# Patient Record
Sex: FEMALE | Race: BLACK OR AFRICAN AMERICAN | NOT HISPANIC OR LATINO | Employment: PART TIME | RURAL
[De-identification: names, ages, dates, MRNs, and addresses within clinical notes are randomized per-mention and may not be internally consistent; named-entity substitution may affect disease eponyms.]

---

## 2020-02-05 ENCOUNTER — HISTORICAL (OUTPATIENT)
Dept: ADMINISTRATIVE | Facility: HOSPITAL | Age: 35
End: 2020-02-05

## 2020-02-08 LAB
LAB AP GENERAL CAT - HISTORICAL: NORMAL
LAB AP INTERPRETATION/RESULT - HISTORICAL: NEGATIVE
LAB AP SPECIMEN ADEQUACY - HISTORICAL: NORMAL
LAB AP SPECIMEN SUBMITTED - HISTORICAL: NORMAL

## 2020-04-28 ENCOUNTER — HISTORICAL (OUTPATIENT)
Dept: ADMINISTRATIVE | Facility: HOSPITAL | Age: 35
End: 2020-04-28

## 2021-04-02 RX ORDER — ACYCLOVIR 400 MG/1
400 TABLET ORAL 2 TIMES DAILY
COMMUNITY
End: 2023-04-13 | Stop reason: ALTCHOICE

## 2021-04-02 RX ORDER — LEVONORGESTREL AND ETHINYL ESTRADIOL 0.1-0.02MG
1 KIT ORAL DAILY
COMMUNITY
End: 2021-04-05 | Stop reason: SDUPTHER

## 2021-04-02 RX ORDER — VALACYCLOVIR HYDROCHLORIDE 1 G/1
1000 TABLET, FILM COATED ORAL DAILY
COMMUNITY
End: 2021-11-18 | Stop reason: SDUPTHER

## 2021-04-05 ENCOUNTER — HISTORICAL (OUTPATIENT)
Dept: ADMINISTRATIVE | Facility: HOSPITAL | Age: 36
End: 2021-04-05

## 2021-04-05 ENCOUNTER — OFFICE VISIT (OUTPATIENT)
Dept: OBSTETRICS AND GYNECOLOGY | Facility: CLINIC | Age: 36
End: 2021-04-05
Payer: OTHER GOVERNMENT

## 2021-04-05 VITALS
TEMPERATURE: 98 F | HEIGHT: 67 IN | HEART RATE: 70 BPM | SYSTOLIC BLOOD PRESSURE: 139 MMHG | WEIGHT: 243.81 LBS | OXYGEN SATURATION: 98 % | BODY MASS INDEX: 38.27 KG/M2 | DIASTOLIC BLOOD PRESSURE: 100 MMHG | RESPIRATION RATE: 16 BRPM

## 2021-04-05 DIAGNOSIS — Z11.4 SCREENING FOR HUMAN IMMUNODEFICIENCY VIRUS: ICD-10-CM

## 2021-04-05 DIAGNOSIS — Z01.419 ROUTINE GYNECOLOGICAL EXAMINATION: Primary | ICD-10-CM

## 2021-04-05 DIAGNOSIS — A60.04 HERPES SIMPLEX VULVOVAGINITIS: ICD-10-CM

## 2021-04-05 DIAGNOSIS — Z11.3 ENCOUNTER FOR SCREENING FOR BACTERIAL SEXUALLY TRANSMITTED DISEASE: ICD-10-CM

## 2021-04-05 DIAGNOSIS — Z11.8 SPECIAL SCREENING EXAMINATION FOR CHLAMYDIAL DISEASE: ICD-10-CM

## 2021-04-05 DIAGNOSIS — N92.1 MENOMETRORRHAGIA: ICD-10-CM

## 2021-04-05 DIAGNOSIS — Z11.3 SCREEN FOR SEXUALLY TRANSMITTED DISEASES: ICD-10-CM

## 2021-04-05 DIAGNOSIS — Z23 FLU VACCINE NEED: ICD-10-CM

## 2021-04-05 DIAGNOSIS — Z01.419 ENCOUNTER FOR GYNECOLOGICAL EXAMINATION WITHOUT ABNORMAL FINDING: ICD-10-CM

## 2021-04-05 DIAGNOSIS — Z12.4 SCREENING FOR MALIGNANT NEOPLASM OF THE CERVIX: ICD-10-CM

## 2021-04-05 DIAGNOSIS — Z30.011 ENCOUNTER FOR INITIAL PRESCRIPTION OF CONTRACEPTIVE PILLS: ICD-10-CM

## 2021-04-05 DIAGNOSIS — Z72.51 HIGH RISK SEXUAL BEHAVIOR, UNSPECIFIED TYPE: ICD-10-CM

## 2021-04-05 LAB
CANDIDA SPECIES: NEGATIVE
GARDNERELLA: POSITIVE
HAV IGM SER QL: NORMAL
HBV CORE IGM SER QL: NORMAL
HBV SURFACE AG SERPL QL IA: NORMAL
HCV AB SER QL: NORMAL
HIV 1+O+2 AB SERPL QL: NORMAL
SYPHILIS AB INTERPRETATION: NORMAL
TRICHOMONAS: NEGATIVE

## 2021-04-05 PROCEDURE — 88142 CYTOPATH C/V THIN LAYER: CPT | Performed by: PATHOLOGY

## 2021-04-05 PROCEDURE — 99395 PR PREVENTIVE VISIT,EST,18-39: ICD-10-PCS | Mod: ,,, | Performed by: OBSTETRICS & GYNECOLOGY

## 2021-04-05 PROCEDURE — 99395 PREV VISIT EST AGE 18-39: CPT | Mod: ,,, | Performed by: OBSTETRICS & GYNECOLOGY

## 2021-04-05 RX ORDER — LEVONORGESTREL AND ETHINYL ESTRADIOL 0.1-0.02MG
1 KIT ORAL DAILY
Qty: 28 TABLET | Refills: 11 | Status: SHIPPED | OUTPATIENT
Start: 2021-04-05 | End: 2021-11-18 | Stop reason: SDUPTHER

## 2021-04-06 LAB
CHLAMYDIA BY PCR: NEGATIVE
N. GONORRHOEAE (GC) BY PCR: NEGATIVE

## 2021-04-08 LAB
HPV 16: NEGATIVE
HPV 18: NEGATIVE
HPV OTHER: NEGATIVE
INSULIN SERPL-ACNC: NORMAL U[IU]/ML
LAB AP CLINICAL INFORMATION: NORMAL
LAB AP GENERAL CAT - HISTORICAL: NORMAL
LAB AP INTERPRETATION/RESULT - HISTORICAL: NEGATIVE
LAB AP SPECIMEN ADEQUACY - HISTORICAL: NORMAL
LAB AP SPECIMEN SUBMITTED - HISTORICAL: NORMAL

## 2021-04-19 ENCOUNTER — TELEPHONE (OUTPATIENT)
Dept: OBSTETRICS AND GYNECOLOGY | Facility: CLINIC | Age: 36
End: 2021-04-19

## 2021-05-03 DIAGNOSIS — R13.10 DYSPHAGIA: Primary | ICD-10-CM

## 2021-05-26 ENCOUNTER — HOSPITAL ENCOUNTER (OUTPATIENT)
Dept: RADIOLOGY | Facility: HOSPITAL | Age: 36
Discharge: HOME | End: 2021-05-26
Payer: OTHER GOVERNMENT

## 2021-05-26 VITALS — WEIGHT: 243 LBS | BODY MASS INDEX: 38.14 KG/M2 | HEIGHT: 67 IN

## 2021-05-26 DIAGNOSIS — Z12.31 ENCOUNTER FOR SCREENING MAMMOGRAM FOR MALIGNANT NEOPLASM OF BREAST: ICD-10-CM

## 2021-05-26 PROCEDURE — 77067 SCR MAMMO BI INCL CAD: CPT | Mod: TC

## 2021-05-27 ENCOUNTER — HOSPITAL ENCOUNTER (OUTPATIENT)
Dept: GASTROENTEROLOGY | Facility: HOSPITAL | Age: 36
Discharge: HOME | End: 2021-05-27
Attending: INTERNAL MEDICINE
Payer: OTHER GOVERNMENT

## 2021-05-27 ENCOUNTER — ANESTHESIA (OUTPATIENT)
Dept: GASTROENTEROLOGY | Facility: HOSPITAL | Age: 36
End: 2021-05-27
Payer: OTHER GOVERNMENT

## 2021-05-27 ENCOUNTER — ANESTHESIA EVENT (OUTPATIENT)
Dept: GASTROENTEROLOGY | Facility: HOSPITAL | Age: 36
End: 2021-05-27
Payer: OTHER GOVERNMENT

## 2021-05-27 VITALS
DIASTOLIC BLOOD PRESSURE: 79 MMHG | SYSTOLIC BLOOD PRESSURE: 140 MMHG | HEART RATE: 68 BPM | OXYGEN SATURATION: 97 % | TEMPERATURE: 97 F | RESPIRATION RATE: 19 BRPM

## 2021-05-27 DIAGNOSIS — K29.00 ACUTE SUPERFICIAL GASTRITIS WITHOUT HEMORRHAGE: ICD-10-CM

## 2021-05-27 DIAGNOSIS — K29.00 ACUTE SUPERFICIAL GASTRITIS WITHOUT HEMORRHAGE: Primary | ICD-10-CM

## 2021-05-27 DIAGNOSIS — R13.10 DYSPHAGIA: ICD-10-CM

## 2021-05-27 DIAGNOSIS — R47.02 DYSPHASIA: Primary | ICD-10-CM

## 2021-05-27 DIAGNOSIS — R13.19 ESOPHAGEAL DYSPHAGIA: ICD-10-CM

## 2021-05-27 LAB
B-HCG UR QL: NEGATIVE
CTP QC/QA: YES
POC URINE HCG: NEGATIVE

## 2021-05-27 PROCEDURE — C1889 IMPLANT/INSERT DEVICE, NOC: HCPCS

## 2021-05-27 PROCEDURE — D9220A PRA ANESTHESIA: Mod: ,,, | Performed by: NURSE ANESTHETIST, CERTIFIED REGISTERED

## 2021-05-27 PROCEDURE — 88305 TISSUE EXAM BY PATHOLOGIST: CPT | Mod: 26,,, | Performed by: PATHOLOGY

## 2021-05-27 PROCEDURE — 25000003 PHARM REV CODE 250: Performed by: NURSE ANESTHETIST, CERTIFIED REGISTERED

## 2021-05-27 PROCEDURE — D9220A PRA ANESTHESIA: ICD-10-PCS | Mod: ,,, | Performed by: NURSE ANESTHETIST, CERTIFIED REGISTERED

## 2021-05-27 PROCEDURE — 81025 URINE PREGNANCY TEST: CPT | Performed by: INTERNAL MEDICINE

## 2021-05-27 PROCEDURE — 27201423 OPTIME MED/SURG SUP & DEVICES STERILE SUPPLY

## 2021-05-27 PROCEDURE — 37000008 HC ANESTHESIA 1ST 15 MINUTES

## 2021-05-27 PROCEDURE — 43450 DILATE ESOPHAGUS 1/MULT PASS: CPT

## 2021-05-27 PROCEDURE — 27000284 HC CANNULA NASAL: Performed by: NURSE ANESTHETIST, CERTIFIED REGISTERED

## 2021-05-27 PROCEDURE — 88342 IMHCHEM/IMCYTCHM 1ST ANTB: CPT | Mod: 26,,, | Performed by: PATHOLOGY

## 2021-05-27 PROCEDURE — 63600175 PHARM REV CODE 636 W HCPCS: Performed by: NURSE ANESTHETIST, CERTIFIED REGISTERED

## 2021-05-27 PROCEDURE — 88342 SURGICAL PATHOLOGY: ICD-10-PCS | Mod: 26,,, | Performed by: PATHOLOGY

## 2021-05-27 PROCEDURE — 88305 SURGICAL PATHOLOGY: ICD-10-PCS | Mod: 26,,, | Performed by: PATHOLOGY

## 2021-05-27 PROCEDURE — 88305 TISSUE EXAM BY PATHOLOGIST: CPT | Mod: SUR | Performed by: INTERNAL MEDICINE

## 2021-05-27 PROCEDURE — 43239 EGD BIOPSY SINGLE/MULTIPLE: CPT

## 2021-05-27 RX ORDER — FENTANYL CITRATE 50 UG/ML
INJECTION, SOLUTION INTRAMUSCULAR; INTRAVENOUS
Status: DISCONTINUED | OUTPATIENT
Start: 2021-05-27 | End: 2021-05-27

## 2021-05-27 RX ORDER — LIDOCAINE HYDROCHLORIDE 20 MG/ML
INJECTION, SOLUTION EPIDURAL; INFILTRATION; INTRACAUDAL; PERINEURAL
Status: DISCONTINUED | OUTPATIENT
Start: 2021-05-27 | End: 2021-05-27

## 2021-05-27 RX ORDER — PROPOFOL 10 MG/ML
INJECTION, EMULSION INTRAVENOUS
Status: DISCONTINUED | OUTPATIENT
Start: 2021-05-27 | End: 2021-05-27

## 2021-05-27 RX ADMIN — PROPOFOL 50 MG: 10 INJECTION, EMULSION INTRAVENOUS at 09:05

## 2021-05-27 RX ADMIN — LIDOCAINE HYDROCHLORIDE 100 MG: 20 INJECTION, SOLUTION INTRAVENOUS at 09:05

## 2021-05-27 RX ADMIN — SODIUM CHLORIDE: 9 INJECTION, SOLUTION INTRAVENOUS at 09:05

## 2021-05-27 RX ADMIN — FENTANYL CITRATE 100 MCG: 50 INJECTION INTRAMUSCULAR; INTRAVENOUS at 09:05

## 2021-05-28 LAB
ESTROGEN SERPL-MCNC: NORMAL PG/ML
LAB AP GROSS DESCRIPTION: NORMAL
LAB AP LABORATORY NOTES: NORMAL
T3RU NFR SERPL: NORMAL %

## 2021-06-02 RX ORDER — PANTOPRAZOLE SODIUM 40 MG/1
40 TABLET, DELAYED RELEASE ORAL DAILY
COMMUNITY

## 2021-06-02 RX ORDER — ARIPIPRAZOLE 20 MG/1
5 TABLET ORAL DAILY
COMMUNITY

## 2021-06-02 RX ORDER — QUETIAPINE FUMARATE 300 MG/1
300 TABLET, FILM COATED ORAL NIGHTLY
COMMUNITY

## 2021-06-02 RX ORDER — IBUPROFEN 200 MG
1 TABLET ORAL
COMMUNITY
End: 2023-04-13

## 2021-06-02 RX ORDER — CHOLESTYRAMINE LIGHT 4 G/5.7G
4 POWDER, FOR SUSPENSION ORAL 2 TIMES DAILY
COMMUNITY

## 2021-06-02 RX ORDER — DULOXETIN HYDROCHLORIDE 30 MG/1
30 CAPSULE, DELAYED RELEASE ORAL DAILY
COMMUNITY

## 2021-06-08 ENCOUNTER — TELEPHONE (OUTPATIENT)
Dept: GASTROENTEROLOGY | Facility: CLINIC | Age: 36
End: 2021-06-08

## 2021-06-09 ENCOUNTER — ANESTHESIA (OUTPATIENT)
Dept: GASTROENTEROLOGY | Facility: HOSPITAL | Age: 36
End: 2021-06-09
Payer: OTHER GOVERNMENT

## 2021-06-09 ENCOUNTER — HOSPITAL ENCOUNTER (OUTPATIENT)
Dept: GASTROENTEROLOGY | Facility: HOSPITAL | Age: 36
Discharge: HOME | End: 2021-06-09
Attending: INTERNAL MEDICINE
Payer: OTHER GOVERNMENT

## 2021-06-09 ENCOUNTER — ANESTHESIA EVENT (OUTPATIENT)
Dept: GASTROENTEROLOGY | Facility: HOSPITAL | Age: 36
End: 2021-06-09
Payer: OTHER GOVERNMENT

## 2021-06-09 VITALS
DIASTOLIC BLOOD PRESSURE: 89 MMHG | BODY MASS INDEX: 39.16 KG/M2 | WEIGHT: 250 LBS | SYSTOLIC BLOOD PRESSURE: 149 MMHG | RESPIRATION RATE: 13 BRPM | HEART RATE: 75 BPM | OXYGEN SATURATION: 100 %

## 2021-06-09 DIAGNOSIS — R13.19 ESOPHAGEAL DYSPHAGIA: ICD-10-CM

## 2021-06-09 DIAGNOSIS — K92.2 LOWER GI BLEEDING: ICD-10-CM

## 2021-06-09 DIAGNOSIS — K57.30 DIVERTICULA, COLON: ICD-10-CM

## 2021-06-09 LAB
B-HCG UR QL: NEGATIVE
CTP QC/QA: YES
POC URINE HCG: NEGATIVE

## 2021-06-09 PROCEDURE — D9220A PRA ANESTHESIA: ICD-10-PCS | Mod: ,,, | Performed by: NURSE ANESTHETIST, CERTIFIED REGISTERED

## 2021-06-09 PROCEDURE — 63600175 PHARM REV CODE 636 W HCPCS: Performed by: NURSE ANESTHETIST, CERTIFIED REGISTERED

## 2021-06-09 PROCEDURE — 00811 ANES LWR INTST NDSC NOS: CPT

## 2021-06-09 PROCEDURE — 37000008 HC ANESTHESIA 1ST 15 MINUTES

## 2021-06-09 PROCEDURE — 25000003 PHARM REV CODE 250: Performed by: NURSE ANESTHETIST, CERTIFIED REGISTERED

## 2021-06-09 PROCEDURE — 45378 DIAGNOSTIC COLONOSCOPY: CPT

## 2021-06-09 PROCEDURE — 27201423 OPTIME MED/SURG SUP & DEVICES STERILE SUPPLY

## 2021-06-09 PROCEDURE — 81025 URINE PREGNANCY TEST: CPT | Performed by: INTERNAL MEDICINE

## 2021-06-09 PROCEDURE — D9220A PRA ANESTHESIA: Mod: ,,, | Performed by: NURSE ANESTHETIST, CERTIFIED REGISTERED

## 2021-06-09 PROCEDURE — 37000009 HC ANESTHESIA EA ADD 15 MINS

## 2021-06-09 PROCEDURE — 27000284 HC CANNULA NASAL: Performed by: NURSE ANESTHETIST, CERTIFIED REGISTERED

## 2021-06-09 RX ORDER — SODIUM CHLORIDE 0.9 % (FLUSH) 0.9 %
10 SYRINGE (ML) INJECTION
Status: DISCONTINUED | OUTPATIENT
Start: 2021-06-09 | End: 2021-06-10 | Stop reason: HOSPADM

## 2021-06-09 RX ORDER — LIDOCAINE HYDROCHLORIDE 20 MG/ML
INJECTION INTRAVENOUS
Status: DISCONTINUED | OUTPATIENT
Start: 2021-06-09 | End: 2021-06-09

## 2021-06-09 RX ORDER — PROPOFOL 10 MG/ML
VIAL (ML) INTRAVENOUS
Status: DISCONTINUED | OUTPATIENT
Start: 2021-06-09 | End: 2021-06-09

## 2021-06-09 RX ADMIN — PROPOFOL 50 MG: 10 INJECTION, EMULSION INTRAVENOUS at 03:06

## 2021-06-09 RX ADMIN — LIDOCAINE HYDROCHLORIDE 50 MG: 20 INJECTION, SOLUTION INTRAVENOUS at 03:06

## 2021-06-09 RX ADMIN — SODIUM CHLORIDE: 9 INJECTION, SOLUTION INTRAVENOUS at 03:06

## 2021-11-18 ENCOUNTER — OFFICE VISIT (OUTPATIENT)
Dept: OBSTETRICS AND GYNECOLOGY | Facility: CLINIC | Age: 36
End: 2021-11-18
Payer: OTHER GOVERNMENT

## 2021-11-18 VITALS
BODY MASS INDEX: 41.91 KG/M2 | TEMPERATURE: 98 F | WEIGHT: 267 LBS | HEART RATE: 78 BPM | HEIGHT: 67 IN | SYSTOLIC BLOOD PRESSURE: 131 MMHG | DIASTOLIC BLOOD PRESSURE: 85 MMHG | RESPIRATION RATE: 18 BRPM

## 2021-11-18 DIAGNOSIS — N92.1 MENOMETRORRHAGIA: Primary | ICD-10-CM

## 2021-11-18 PROCEDURE — 99214 PR OFFICE/OUTPT VISIT, EST, LEVL IV, 30-39 MIN: ICD-10-PCS | Mod: ,,, | Performed by: OBSTETRICS & GYNECOLOGY

## 2021-11-18 PROCEDURE — 99214 OFFICE O/P EST MOD 30 MIN: CPT | Mod: ,,, | Performed by: OBSTETRICS & GYNECOLOGY

## 2021-11-18 RX ORDER — LEVONORGESTREL AND ETHINYL ESTRADIOL 0.1-0.02MG
1 KIT ORAL DAILY
Qty: 28 TABLET | Refills: 11 | Status: SHIPPED | OUTPATIENT
Start: 2021-11-18 | End: 2022-08-01 | Stop reason: SDUPTHER

## 2021-11-18 RX ORDER — VALACYCLOVIR HYDROCHLORIDE 1 G/1
1000 TABLET, FILM COATED ORAL DAILY
Qty: 30 TABLET | Refills: 11 | Status: SHIPPED | OUTPATIENT
Start: 2021-11-18 | End: 2023-04-13 | Stop reason: SDUPTHER

## 2022-04-12 ENCOUNTER — OFFICE VISIT (OUTPATIENT)
Dept: OBSTETRICS AND GYNECOLOGY | Facility: CLINIC | Age: 37
End: 2022-04-12
Payer: OTHER GOVERNMENT

## 2022-04-12 VITALS
BODY MASS INDEX: 42.38 KG/M2 | RESPIRATION RATE: 18 BRPM | WEIGHT: 270 LBS | SYSTOLIC BLOOD PRESSURE: 143 MMHG | DIASTOLIC BLOOD PRESSURE: 95 MMHG | HEIGHT: 67 IN | HEART RATE: 81 BPM

## 2022-04-12 DIAGNOSIS — Z11.3 SCREEN FOR STD (SEXUALLY TRANSMITTED DISEASE): ICD-10-CM

## 2022-04-12 DIAGNOSIS — N94.6 DYSMENORRHEA: ICD-10-CM

## 2022-04-12 DIAGNOSIS — Z12.4 SCREENING FOR MALIGNANT NEOPLASM OF THE CERVIX: ICD-10-CM

## 2022-04-12 DIAGNOSIS — Z11.4 SCREENING FOR HUMAN IMMUNODEFICIENCY VIRUS: ICD-10-CM

## 2022-04-12 DIAGNOSIS — N92.0 MENORRHAGIA WITH REGULAR CYCLE: ICD-10-CM

## 2022-04-12 DIAGNOSIS — Z01.419 ROUTINE GYNECOLOGICAL EXAMINATION: Primary | ICD-10-CM

## 2022-04-12 DIAGNOSIS — Z72.51 HIGH RISK SEXUAL BEHAVIOR, UNSPECIFIED TYPE: ICD-10-CM

## 2022-04-12 LAB
CANDIDA SPECIES: NEGATIVE
GARDNERELLA: NEGATIVE
HAV IGM SER QL: NORMAL
HBV CORE IGM SER QL: NORMAL
HBV SURFACE AG SERPL QL IA: NORMAL
HCV AB SER QL: NORMAL
HIV 1+O+2 AB SERPL QL: NORMAL
SYPHILIS AB INTERPRETATION: NORMAL
TRICHOMONAS: POSITIVE

## 2022-04-12 PROCEDURE — 87591 CHLAMYDIA/GONORRHOEAE(GC), PCR: ICD-10-PCS | Mod: ,,, | Performed by: CLINICAL MEDICAL LABORATORY

## 2022-04-12 PROCEDURE — 87491 CHLMYD TRACH DNA AMP PROBE: CPT | Mod: ,,, | Performed by: CLINICAL MEDICAL LABORATORY

## 2022-04-12 PROCEDURE — 87660 BACTERIAL VAGINOSIS: ICD-10-PCS | Mod: ,,, | Performed by: CLINICAL MEDICAL LABORATORY

## 2022-04-12 PROCEDURE — 80074 ACUTE HEPATITIS PANEL: CPT | Mod: ,,, | Performed by: CLINICAL MEDICAL LABORATORY

## 2022-04-12 PROCEDURE — 87660 TRICHOMONAS VAGIN DIR PROBE: CPT | Mod: ,,, | Performed by: CLINICAL MEDICAL LABORATORY

## 2022-04-12 PROCEDURE — 87510 BACTERIAL VAGINOSIS: ICD-10-PCS | Mod: ,,, | Performed by: CLINICAL MEDICAL LABORATORY

## 2022-04-12 PROCEDURE — 87480 CANDIDA DNA DIR PROBE: CPT | Mod: ,,, | Performed by: CLINICAL MEDICAL LABORATORY

## 2022-04-12 PROCEDURE — 87510 GARDNER VAG DNA DIR PROBE: CPT | Mod: ,,, | Performed by: CLINICAL MEDICAL LABORATORY

## 2022-04-12 PROCEDURE — 87389 HIV 1 / 2 ANTIBODY: ICD-10-PCS | Mod: ,,, | Performed by: CLINICAL MEDICAL LABORATORY

## 2022-04-12 PROCEDURE — 87624 HUMAN PAPILLOMAVIRUS (HPV): ICD-10-PCS | Mod: ,,, | Performed by: CLINICAL MEDICAL LABORATORY

## 2022-04-12 PROCEDURE — 87389 HIV-1 AG W/HIV-1&-2 AB AG IA: CPT | Mod: ,,, | Performed by: CLINICAL MEDICAL LABORATORY

## 2022-04-12 PROCEDURE — 87591 N.GONORRHOEAE DNA AMP PROB: CPT | Mod: ,,, | Performed by: CLINICAL MEDICAL LABORATORY

## 2022-04-12 PROCEDURE — 36415 PR COLLECTION VENOUS BLOOD,VENIPUNCTURE: ICD-10-PCS | Mod: ,,, | Performed by: OBSTETRICS & GYNECOLOGY

## 2022-04-12 PROCEDURE — 99395 PREV VISIT EST AGE 18-39: CPT | Mod: ,,, | Performed by: OBSTETRICS & GYNECOLOGY

## 2022-04-12 PROCEDURE — 87624 HPV HI-RISK TYP POOLED RSLT: CPT | Mod: ,,, | Performed by: CLINICAL MEDICAL LABORATORY

## 2022-04-12 PROCEDURE — 99395 PR PREVENTIVE VISIT,EST,18-39: ICD-10-PCS | Mod: ,,, | Performed by: OBSTETRICS & GYNECOLOGY

## 2022-04-12 PROCEDURE — 86780 TREPONEMA PALLIDUM (SYPHILIS) ANTIBODY: ICD-10-PCS | Mod: ,,, | Performed by: CLINICAL MEDICAL LABORATORY

## 2022-04-12 PROCEDURE — 88142 CYTOPATH C/V THIN LAYER: CPT | Mod: GCY | Performed by: OBSTETRICS & GYNECOLOGY

## 2022-04-12 PROCEDURE — 87491 CHLAMYDIA/GONORRHOEAE(GC), PCR: ICD-10-PCS | Mod: ,,, | Performed by: CLINICAL MEDICAL LABORATORY

## 2022-04-12 PROCEDURE — 80074 HEPATITIS PANEL, ACUTE: ICD-10-PCS | Mod: ,,, | Performed by: CLINICAL MEDICAL LABORATORY

## 2022-04-12 PROCEDURE — 86780 TREPONEMA PALLIDUM: CPT | Mod: ,,, | Performed by: CLINICAL MEDICAL LABORATORY

## 2022-04-12 PROCEDURE — 87480 BACTERIAL VAGINOSIS: ICD-10-PCS | Mod: ,,, | Performed by: CLINICAL MEDICAL LABORATORY

## 2022-04-12 PROCEDURE — 36415 COLL VENOUS BLD VENIPUNCTURE: CPT | Mod: ,,, | Performed by: OBSTETRICS & GYNECOLOGY

## 2022-04-12 NOTE — PROGRESS NOTES
"CC: Well woman exam    Amanda Mccracken is a 36 y.o. female  presents for well woman exam.  LMP: Patient's last menstrual period was 2022 (exact date)..  Pt complains of heavy cycles lasting 6 days with painful menses.    Past Medical History:   Diagnosis Date    Acute superficial gastritis without hemorrhage 2021    Chlamydia     Diverticula, colon 2021    Esophageal dysphagia 2021    Gonorrhea     HSV (herpes simplex virus) infection     Lower GI bleeding 2021    PTSD (post-traumatic stress disorder)      Past Surgical History:   Procedure Laterality Date    CHOLECYSTECTOMY      DIAGNOSTIC LAPAROSCOPY      DILATION AND CURETTAGE OF UTERUS      LAPAROSCOPIC MYOMECTOMY  2013    TOTAL REDUCTION MAMMOPLASTY       Social History     Socioeconomic History    Marital status: Single   Tobacco Use    Smoking status: Never Smoker    Smokeless tobacco: Never Used   Substance and Sexual Activity    Alcohol use: Yes     Comment: socially    Drug use: Not Currently     Comment: CBD use    Sexual activity: Yes     Partners: Male     Birth control/protection: OCP, None     Family History   Problem Relation Age of Onset    Hypertension Father     Hypertension Mother     No Known Problems Brother     No Known Problems Sister     No Known Problems Brother     No Known Problems Brother     No Known Problems Sister     No Known Problems Sister     No Known Problems Son      OB History        1    Para   1    Term   1            AB        Living   1       SAB        IAB        Ectopic        Multiple        Live Births   1                 BP (!) 143/95 (BP Location: Left arm, Patient Position: Sitting, BP Method: Large (Manual))   Pulse 81   Resp 18   Ht 5' 7" (1.702 m)   Wt 122.5 kg (270 lb)   LMP 2022 (Exact Date)   BMI 42.29 kg/m²       ROS:  GENERAL: Denies weight gain or weight loss. Feeling well overall.   SKIN: Denies rash or lesions. "   HEAD: Denies head injury or headache.   NODES: Denies enlarged lymph nodes.   CHEST: Denies chest pain or shortness of breath.   CARDIOVASCULAR: Denies palpitations or left sided chest pain.   ABDOMEN: No abdominal pain, constipation, diarrhea, nausea, vomiting or rectal bleeding.   URINARY: No frequency, dysuria, hematuria, or burning on urination.  REPRODUCTIVE: See HPI.   BREASTS: The patient performs breast self-examination and denies pain, lumps, or nipple discharge.   HEMATOLOGIC: No easy bruisability or excessive bleeding.   MUSCULOSKELETAL: Denies joint pain or swelling.   NEUROLOGIC: Denies syncope or weakness.   PSYCHIATRIC: Denies depression, anxiety or mood swings.    PHYSICAL EXAM:  APPEARANCE: Well nourished, well developed, in no acute distress.  AFFECT: WNL, alert and oriented x 3  SKIN: No acne or hirsutism  NECK: Neck symmetric without masses or thyromegaly  NODES: No inguinal, cervical, axillary, or femoral lymph node enlargement  CHEST: Good respiratory effect  ABDOMEN: Soft.  No tenderness or masses.  No hepatosplenomegaly.  No hernias.  BREASTS: Symmetrical, no skin changes or visible lesions.  No palpable masses, nipple discharge bilaterally.  PELVIC: Normal external genitalia without lesions.  Normal hair distribution.  Adequate perineal body, normal urethral meatus.  Vagina moist and well rugated without lesions or discharge.  Cervix pink, without lesions, discharge or tenderness.  No significant cystocele or rectocele.  Bimanual exam shows uterus to be normal size, regular, mobile and nontender.  Adnexa without masses or tenderness.    EXTREMITIES: No edema.    Routine gynecological examination  -     ThinPrep Pap Test; Future; Expected date: 04/12/2022    Screening for malignant neoplasm of the cervix  -     ThinPrep Pap Test; Future; Expected date: 04/12/2022    Screen for STD (sexually transmitted disease)  -     Chlamydia/GC, PCR; Future; Expected date: 04/12/2022  -     HIV 1/2 Ag/Ab  (4th Gen); Future; Expected date: 04/12/2022  -     Treponema Pallidum (Syphillis) Antibody; Future; Expected date: 04/12/2022  -     Hepatitis Panel, Acute; Future; Expected date: 04/12/2022  -     Bacterial Vaginosis; Future; Expected date: 04/12/2022    High risk sexual behavior, unspecified type  -     Chlamydia/GC, PCR; Future; Expected date: 04/12/2022  -     HIV 1/2 Ag/Ab (4th Gen); Future; Expected date: 04/12/2022  -     Treponema Pallidum (Syphillis) Antibody; Future; Expected date: 04/12/2022  -     Hepatitis Panel, Acute; Future; Expected date: 04/12/2022  -     Bacterial Vaginosis; Future; Expected date: 04/12/2022    Screening for human immunodeficiency virus  -     HIV 1/2 Ag/Ab (4th Gen); Future; Expected date: 04/12/2022    Menorrhagia with regular cycle  -     US Pelvis Complete Non OB; Future; Expected date: 04/19/2022    Dysmenorrhea  -     US Pelvis Complete Non OB; Future; Expected date: 04/19/2022            Patient was counseled today on A.C.S. Pap guidelines and recommendations for yearly pelvic exams, mammograms and monthly self breast exams; to see her PCP for other health maintenance.   Exercise regimen encouraged  Healthy food choices encouraged  Questions answered to desired level of satisfaction  Verbalized understanding to all information and instructions    Follow up in about 1 year (around 4/12/2023) for 1 week US, 2 weeks EMB/Endosee and 1 year Annual Exam.

## 2022-04-13 LAB
CHLAMYDIA BY PCR: NEGATIVE
N. GONORRHOEAE (GC) BY PCR: NEGATIVE

## 2022-04-14 ENCOUNTER — TELEPHONE (OUTPATIENT)
Dept: OBSTETRICS AND GYNECOLOGY | Facility: CLINIC | Age: 37
End: 2022-04-14
Payer: OTHER GOVERNMENT

## 2022-04-14 NOTE — TELEPHONE ENCOUNTER
----- Message from Nelly Mack sent at 4/14/2022  9:51 AM CDT -----  Forgot to mention something to  when she was in here on tuesday

## 2022-04-14 NOTE — TELEPHONE ENCOUNTER
----- Message from Catie Chino RN sent at 4/14/2022  1:38 PM CDT -----  Trichomonas Positive; Rx to Pharmacy per Dr. Turner

## 2022-04-14 NOTE — TELEPHONE ENCOUNTER
Verbal Rx per Dr. Turner w/read back: Flagyl (metronidazole)  500 mg 2x/day for 7 days, refill 0    Contacted [facility] in [place], [place] at [phone]; Verbal Rx given to [facility] w/read back

## 2022-04-15 LAB
GH SERPL-MCNC: NORMAL NG/ML
HPV 16: NEGATIVE
HPV 18: NEGATIVE
HPV OTHER: NEGATIVE
INSULIN SERPL-ACNC: NORMAL U[IU]/ML
LAB AP CLINICAL INFORMATION: NORMAL
LAB AP GYN INTERPRETATION: NEGATIVE
LAB AP PAP DISCLAIMER COMMENTS: NORMAL
RENIN PLAS-CCNC: NORMAL NG/ML/H

## 2022-04-19 ENCOUNTER — PROCEDURE VISIT (OUTPATIENT)
Dept: OBSTETRICS AND GYNECOLOGY | Facility: CLINIC | Age: 37
End: 2022-04-19
Attending: OBSTETRICS & GYNECOLOGY
Payer: OTHER GOVERNMENT

## 2022-04-19 DIAGNOSIS — N94.6 DYSMENORRHEA: ICD-10-CM

## 2022-04-19 DIAGNOSIS — N92.0 MENORRHAGIA WITH REGULAR CYCLE: ICD-10-CM

## 2022-04-19 PROCEDURE — 76856 PR  ECHO,PELVIC (NONOBSTETRIC): ICD-10-PCS | Mod: ,,, | Performed by: OBSTETRICS & GYNECOLOGY

## 2022-04-19 PROCEDURE — 76856 US EXAM PELVIC COMPLETE: CPT | Mod: ,,, | Performed by: OBSTETRICS & GYNECOLOGY

## 2022-04-19 PROCEDURE — 99499 UNLISTED E&M SERVICE: CPT | Mod: ,,, | Performed by: OBSTETRICS & GYNECOLOGY

## 2022-04-19 PROCEDURE — 99499 NO LOS: ICD-10-PCS | Mod: ,,, | Performed by: OBSTETRICS & GYNECOLOGY

## 2022-04-26 ENCOUNTER — PROCEDURE VISIT (OUTPATIENT)
Dept: OBSTETRICS AND GYNECOLOGY | Facility: CLINIC | Age: 37
End: 2022-04-26
Payer: OTHER GOVERNMENT

## 2022-04-26 VITALS
BODY MASS INDEX: 42.53 KG/M2 | RESPIRATION RATE: 18 BRPM | SYSTOLIC BLOOD PRESSURE: 139 MMHG | HEIGHT: 67 IN | WEIGHT: 271 LBS | HEART RATE: 63 BPM | DIASTOLIC BLOOD PRESSURE: 83 MMHG

## 2022-04-26 DIAGNOSIS — N94.6 DYSMENORRHEA: ICD-10-CM

## 2022-04-26 DIAGNOSIS — N92.0 MENORRHAGIA WITH REGULAR CYCLE: Primary | ICD-10-CM

## 2022-04-26 LAB
B-HCG UR QL: NEGATIVE
CTP QC/QA: YES

## 2022-04-26 PROCEDURE — 81025 URINE PREGNANCY TEST: CPT | Mod: QW,,, | Performed by: OBSTETRICS & GYNECOLOGY

## 2022-04-26 PROCEDURE — 58558 HYSTEROSCOPY BIOPSY: CPT | Mod: ,,, | Performed by: OBSTETRICS & GYNECOLOGY

## 2022-04-26 PROCEDURE — 99499 NO LOS: ICD-10-PCS | Mod: ,,, | Performed by: OBSTETRICS & GYNECOLOGY

## 2022-04-26 PROCEDURE — 88305 SURGICAL PATHOLOGY: ICD-10-PCS | Mod: 26,,, | Performed by: PATHOLOGY

## 2022-04-26 PROCEDURE — 88305 TISSUE EXAM BY PATHOLOGIST: CPT | Mod: 26,,, | Performed by: PATHOLOGY

## 2022-04-26 PROCEDURE — 88305 TISSUE EXAM BY PATHOLOGIST: CPT | Mod: SUR | Performed by: OBSTETRICS & GYNECOLOGY

## 2022-04-26 PROCEDURE — 99499 UNLISTED E&M SERVICE: CPT | Mod: ,,, | Performed by: OBSTETRICS & GYNECOLOGY

## 2022-04-26 PROCEDURE — 58558 PR HYSTEROSCOPY,W/ENDO BX: ICD-10-PCS | Mod: ,,, | Performed by: OBSTETRICS & GYNECOLOGY

## 2022-04-26 PROCEDURE — 81025 POCT URINE PREGNANCY: ICD-10-PCS | Mod: QW,,, | Performed by: OBSTETRICS & GYNECOLOGY

## 2022-04-26 NOTE — PROCEDURES
Procedures   Hysteorscopy, EMB Procedure Note:    Following informed written consent, the pt was placed in the lithotomy position. The speculum was inserted into the vagina and the cervix visualized with ease. The cervix was cleaned with betadine.  The uterus was sounded to7 cm with the uterine sound. The hysteroscope was then inserted into the cervical os and the uterine cavity was directly visualized, bilateral ostea were noted at that time. Findings no lesions, polyps or fibroids seen. The hysteroscope was then removed and a biopsy of the endometrium performed with a pipelle. Specimen sent to pathology for evaluation. The single tooth tenaculum was then removed and the cervix was made hemostatic with pressure.

## 2022-04-28 LAB
DHEA SERPL-MCNC: NORMAL
ESTROGEN SERPL-MCNC: NORMAL PG/ML
INSULIN SERPL-ACNC: NORMAL U[IU]/ML
LAB AP CLINICAL INFORMATION: NORMAL
LAB AP GROSS DESCRIPTION: NORMAL
LAB AP LABORATORY NOTES: NORMAL
T3RU NFR SERPL: NORMAL %

## 2022-04-29 NOTE — PROCEDURES
Procedures   Ultrasound note:    Uterus 8.56 x 4.17 x 4.98 cm  Endometrial thickness 3.91 mm    Left ovary 23.78 x 15.69 x 20.78 mm  Right ovary 31.69 x 25.49 x 25.89 mm    Impression:  Uterus has a heterogeneous texture  No free fluid seen in the adnexa

## 2022-07-21 ENCOUNTER — TELEPHONE (OUTPATIENT)
Dept: OBSTETRICS AND GYNECOLOGY | Facility: CLINIC | Age: 37
End: 2022-07-21
Payer: OTHER GOVERNMENT

## 2022-07-21 NOTE — TELEPHONE ENCOUNTER
Called pt and pt stated she needed one month of OCP Aviane; Informed pt that she will need to keep her next appt/ verbal order with read back from  to give pt one month supply of Aviane. Pt verbalized understanding and stated she would like to Rx called into [facility] in [place].

## 2022-07-21 NOTE — TELEPHONE ENCOUNTER
----- Message from Mike Gregg sent at 7/18/2022  3:48 PM CDT -----  Patient called wanting to get refill one her birth control prescription. She can be reached @ [phone]

## 2022-08-01 ENCOUNTER — OFFICE VISIT (OUTPATIENT)
Dept: OBSTETRICS AND GYNECOLOGY | Facility: CLINIC | Age: 37
End: 2022-08-01
Payer: OTHER GOVERNMENT

## 2022-08-01 VITALS
BODY MASS INDEX: 43.47 KG/M2 | RESPIRATION RATE: 18 BRPM | SYSTOLIC BLOOD PRESSURE: 154 MMHG | HEIGHT: 67 IN | WEIGHT: 277 LBS | HEART RATE: 82 BPM | DIASTOLIC BLOOD PRESSURE: 95 MMHG

## 2022-08-01 DIAGNOSIS — Z72.51 RISK FOR SEXUALLY TRANSMITTED DISEASE: Primary | ICD-10-CM

## 2022-08-01 DIAGNOSIS — Z30.40 ENCOUNTER FOR SURVEILLANCE OF CONTRACEPTIVES, UNSPECIFIED CONTRACEPTIVE: ICD-10-CM

## 2022-08-01 DIAGNOSIS — Z11.3 SCREENING EXAMINATION FOR STD (SEXUALLY TRANSMITTED DISEASE): ICD-10-CM

## 2022-08-01 PROCEDURE — 99214 OFFICE O/P EST MOD 30 MIN: CPT | Mod: ,,, | Performed by: OBSTETRICS & GYNECOLOGY

## 2022-08-01 PROCEDURE — 99214 PR OFFICE/OUTPT VISIT, EST, LEVL IV, 30-39 MIN: ICD-10-PCS | Mod: ,,, | Performed by: OBSTETRICS & GYNECOLOGY

## 2022-08-01 RX ORDER — LEVONORGESTREL AND ETHINYL ESTRADIOL 0.1-0.02MG
1 KIT ORAL DAILY
Qty: 28 TABLET | Refills: 11 | Status: SHIPPED | OUTPATIENT
Start: 2022-08-01 | End: 2023-04-13 | Stop reason: SDUPTHER

## 2022-08-01 NOTE — PROGRESS NOTES
Subjective:       Patient ID: Amanda Mccracken is a 36 y.o. female.    Chief Complaint:  Follow-up (Follow-up from EMB on 2022), Other Misc (CHRIS Trich Positive 2022), and Medication Refill (Patient requests refill on OCP )      History of Present Illness  Pt here for f/u from hysteroscopy- EMB in 2022. Pt states that she has noticed a significant difference in her cycle since restarting OCPs. She says that she is no longer bleeding for 2 weeks at a time.   Pt also states her cycles are not as painful/ Pt reports bleeding for 6 days on her cycles/    GYN & OB History  Patient's last menstrual period was 2022 (exact date).   Date of Last Pap: No result found    OB History    Para Term  AB Living   1 1 1     1   SAB IAB Ectopic Multiple Live Births           1      # Outcome Date GA Lbr Crow/2nd Weight Sex Delivery Anes PTL Lv   1 Term 14 40w0d   M Vag-Spont   COTY       Review of Systems  Review of Systems   Constitutional: Negative for activity change, appetite change, fatigue and unexpected weight change.   HENT: Negative.    Respiratory: Negative for cough, shortness of breath and wheezing.    Cardiovascular: Negative for chest pain, palpitations and leg swelling.   Gastrointestinal: Negative for abdominal pain, bloating, blood in stool, constipation, diarrhea, nausea, vomiting and reflux.   Genitourinary: Negative for bladder incontinence, decreased libido, dysmenorrhea, dyspareunia, dysuria, flank pain, frequency, menorrhagia, menstrual problem, pelvic pain, urgency, vaginal bleeding, vaginal discharge, postcoital bleeding and vaginal odor.   Musculoskeletal: Negative for back pain.   Integumentary:  Negative for rash, acne, hair changes, mole/lesion, breast mass, nipple discharge, breast skin changes and breast tenderness.   Neurological: Negative for headaches.   Psychiatric/Behavioral: Negative for depression and sleep disturbance. The patient is not nervous/anxious.     Breast: Negative for asymmetry, breast self exam, lump, mass, nipple discharge, skin changes and tenderness          Objective:    Physical Exam:   Constitutional: She is oriented to person, place, and time. She appears well-developed and well-nourished.    HENT:   Head: Normocephalic and atraumatic.    Eyes: Pupils are equal, round, and reactive to light. EOM are normal.     Cardiovascular: Normal rate, regular rhythm and normal heart sounds.     Pulmonary/Chest: Effort normal and breath sounds normal.        Abdominal: Soft. Bowel sounds are normal.     Genitourinary:    Vagina, uterus, right adnexa and left adnexa normal.   Cervix is normal.           Musculoskeletal: Normal range of motion and moves all extremeties.       Neurological: She is alert and oriented to person, place, and time. She has normal reflexes.     Psychiatric: She has a normal mood and affect. Her behavior is normal. Judgment and thought content normal.          Assessment:        1. Risk for sexually transmitted disease    2. Screening examination for STD (sexually transmitted disease)    3. Encounter for surveillance of contraceptives, unspecified contraceptive       Pt states she does not want to do anything different at this time and wishes to continue with her birth control.         Plan:      See above

## 2022-12-03 ENCOUNTER — OFFICE VISIT (OUTPATIENT)
Dept: FAMILY MEDICINE | Facility: CLINIC | Age: 37
End: 2022-12-03
Payer: OTHER GOVERNMENT

## 2022-12-03 VITALS
HEIGHT: 67 IN | TEMPERATURE: 100 F | BODY MASS INDEX: 42.95 KG/M2 | SYSTOLIC BLOOD PRESSURE: 136 MMHG | WEIGHT: 273.63 LBS | DIASTOLIC BLOOD PRESSURE: 101 MMHG | HEART RATE: 106 BPM

## 2022-12-03 DIAGNOSIS — Z20.828 VIRAL DISEASE EXPOSURE: ICD-10-CM

## 2022-12-03 DIAGNOSIS — R05.9 COUGH, UNSPECIFIED TYPE: ICD-10-CM

## 2022-12-03 DIAGNOSIS — R06.2 WHEEZE: ICD-10-CM

## 2022-12-03 DIAGNOSIS — J11.1 INFLUENZA-LIKE ILLNESS: Primary | ICD-10-CM

## 2022-12-03 PROBLEM — Z76.0 ENCOUNTER FOR MEDICATION REFILL: Status: ACTIVE | Noted: 2022-12-03

## 2022-12-03 PROBLEM — D64.9 ANEMIA: Status: ACTIVE | Noted: 2022-12-03

## 2022-12-03 PROBLEM — K76.0 FATTY LIVER: Status: ACTIVE | Noted: 2022-12-03

## 2022-12-03 PROBLEM — Z02.89 HEALTH EXAMINATION OF DEFINED SUBPOPULATION: Status: ACTIVE | Noted: 2021-04-05

## 2022-12-03 PROBLEM — K21.9 GASTROESOPHAGEAL REFLUX DISEASE: Status: ACTIVE | Noted: 2022-12-03

## 2022-12-03 PROBLEM — G43.809 MIGRAINE VARIANTS, NOT INTRACTABLE: Status: ACTIVE | Noted: 2022-12-03

## 2022-12-03 PROBLEM — F41.9 ANXIETY: Status: ACTIVE | Noted: 2022-12-03

## 2022-12-03 PROBLEM — R10.84 GENERALIZED ABDOMINAL PAIN: Status: ACTIVE | Noted: 2022-12-03

## 2022-12-03 PROBLEM — E66.9 OBESITY: Status: ACTIVE | Noted: 2022-12-03

## 2022-12-03 PROBLEM — F32.A DEPRESSION: Status: ACTIVE | Noted: 2022-12-03

## 2022-12-03 PROBLEM — Z78.9 OTHER SPECIFIED CONDITION INFLUENCING HEALTH: Status: ACTIVE | Noted: 2022-12-03

## 2022-12-03 PROBLEM — F33.1 MAJOR DEPRESSIVE DISORDER, RECURRENT, MODERATE: Status: ACTIVE | Noted: 2022-12-03

## 2022-12-03 LAB
CTP QC/QA: YES
FLUAV AG NPH QL: NEGATIVE
FLUBV AG NPH QL: NEGATIVE
S PYO RRNA THROAT QL PROBE: NEGATIVE
SARS-COV-2 AG RESP QL IA.RAPID: NEGATIVE

## 2022-12-03 PROCEDURE — 87880 POCT RAPID STREP A: ICD-10-PCS | Mod: QW,,, | Performed by: NURSE PRACTITIONER

## 2022-12-03 PROCEDURE — 87804 INFLUENZA ASSAY W/OPTIC: CPT | Mod: QW,,, | Performed by: NURSE PRACTITIONER

## 2022-12-03 PROCEDURE — 99204 PR OFFICE/OUTPT VISIT, NEW, LEVL IV, 45-59 MIN: ICD-10-PCS | Mod: 25,,, | Performed by: NURSE PRACTITIONER

## 2022-12-03 PROCEDURE — 99051 PR MEDICAL SERVICES, EVE/WKEND/HOLIDAY: ICD-10-PCS | Mod: ,,, | Performed by: NURSE PRACTITIONER

## 2022-12-03 PROCEDURE — 87426 SARS CORONAVIRUS 2 ANTIGEN POCT: ICD-10-PCS | Mod: QW,,, | Performed by: NURSE PRACTITIONER

## 2022-12-03 PROCEDURE — 87880 STREP A ASSAY W/OPTIC: CPT | Mod: QW,,, | Performed by: NURSE PRACTITIONER

## 2022-12-03 PROCEDURE — 99051 MED SERV EVE/WKEND/HOLIDAY: CPT | Mod: ,,, | Performed by: NURSE PRACTITIONER

## 2022-12-03 PROCEDURE — 87426 SARSCOV CORONAVIRUS AG IA: CPT | Mod: QW,,, | Performed by: NURSE PRACTITIONER

## 2022-12-03 PROCEDURE — 87804 POCT INFLUENZA A/B: ICD-10-PCS | Mod: QW,,, | Performed by: NURSE PRACTITIONER

## 2022-12-03 PROCEDURE — 96372 THER/PROPH/DIAG INJ SC/IM: CPT | Mod: ,,, | Performed by: NURSE PRACTITIONER

## 2022-12-03 PROCEDURE — 99204 OFFICE O/P NEW MOD 45 MIN: CPT | Mod: 25,,, | Performed by: NURSE PRACTITIONER

## 2022-12-03 PROCEDURE — 96372 PR INJECTION,THERAP/PROPH/DIAG2ST, IM OR SUBCUT: ICD-10-PCS | Mod: ,,, | Performed by: NURSE PRACTITIONER

## 2022-12-03 RX ORDER — DEXAMETHASONE SODIUM PHOSPHATE 4 MG/ML
4 INJECTION, SOLUTION INTRA-ARTICULAR; INTRALESIONAL; INTRAMUSCULAR; INTRAVENOUS; SOFT TISSUE
Status: COMPLETED | OUTPATIENT
Start: 2022-12-03 | End: 2022-12-03

## 2022-12-03 RX ORDER — DEXAMETHASONE SODIUM PHOSPHATE 4 MG/ML
4 INJECTION, SOLUTION INTRA-ARTICULAR; INTRALESIONAL; INTRAMUSCULAR; INTRAVENOUS; SOFT TISSUE
Status: DISCONTINUED | OUTPATIENT
Start: 2022-12-03 | End: 2022-12-03

## 2022-12-03 RX ORDER — PROMETHAZINE HYDROCHLORIDE AND DEXTROMETHORPHAN HYDROBROMIDE 6.25; 15 MG/5ML; MG/5ML
5 SYRUP ORAL EVERY 6 HOURS PRN
Qty: 150 ML | Refills: 0 | Status: SHIPPED | OUTPATIENT
Start: 2022-12-03 | End: 2022-12-13

## 2022-12-03 RX ORDER — TRAZODONE HYDROCHLORIDE 50 MG/1
1 TABLET ORAL NIGHTLY
COMMUNITY
Start: 2022-11-20

## 2022-12-03 RX ORDER — OSELTAMIVIR PHOSPHATE 75 MG/1
75 CAPSULE ORAL 2 TIMES DAILY
Qty: 10 CAPSULE | Refills: 0 | Status: SHIPPED | OUTPATIENT
Start: 2022-12-03 | End: 2022-12-08

## 2022-12-03 RX ORDER — ALBUTEROL SULFATE 0.83 MG/ML
2.5 SOLUTION RESPIRATORY (INHALATION) EVERY 6 HOURS PRN
Qty: 30 EACH | Refills: 5 | Status: SHIPPED | OUTPATIENT
Start: 2022-12-03 | End: 2023-12-03

## 2022-12-03 RX ADMIN — DEXAMETHASONE SODIUM PHOSPHATE 4 MG: 4 INJECTION, SOLUTION INTRA-ARTICULAR; INTRALESIONAL; INTRAMUSCULAR; INTRAVENOUS; SOFT TISSUE at 03:12

## 2022-12-03 NOTE — PROGRESS NOTES
"Subjective:       Patient ID: Amanda Mccracken is a 37 y.o. female.    Chief Complaint: Cough, Generalized Body Aches, Fever, Fatigue, Sore Throat, and Headache (Symptoms started 12/2/22)    Presents to clinic as above. Son with her today and tested positive for flu today. No hx of asthma. But, since she had covid last year,she has had to use an inhaler intermittently for SOB. BP mildly elevated on triage. No hx of HTN. Has been taking OTC cough and cold meds. Enc to not take any OTC meds except for ibuprofen or tylenol for fever. Voiced agreement.     Review of Systems   Constitutional:  Positive for chills, fever and malaise/fatigue.   HENT:  Positive for congestion, sinus pain and sore throat. Negative for ear pain.    Respiratory:  Positive for cough.    Cardiovascular: Negative.    Gastrointestinal:  Positive for nausea.   Musculoskeletal:  Positive for myalgias.   Neurological:  Positive for headaches.        Reviewed family, medical, surgical, and social history.    Objective:      BP (!) 136/101   Pulse 106   Temp 99.5 °F (37.5 °C)   Ht 5' 7" (1.702 m)   Wt 124.1 kg (273 lb 9.6 oz)   LMP 11/07/2022   BMI 42.85 kg/m²   Physical Exam  Vitals and nursing note reviewed.   Constitutional:       General: She is not in acute distress.     Appearance: Normal appearance. She is not ill-appearing, toxic-appearing or diaphoretic.   HENT:      Head: Normocephalic.      Right Ear: Hearing, tympanic membrane, ear canal and external ear normal.      Left Ear: Hearing, tympanic membrane, ear canal and external ear normal.      Nose: Mucosal edema, congestion and rhinorrhea present. Rhinorrhea is clear.      Right Turbinates: Enlarged and swollen.      Left Turbinates: Enlarged and swollen.      Right Sinus: No maxillary sinus tenderness or frontal sinus tenderness.      Left Sinus: No maxillary sinus tenderness or frontal sinus tenderness.      Mouth/Throat:      Lips: Pink.      Mouth: Mucous membranes are moist.     "  Pharynx: Uvula midline. Posterior oropharyngeal erythema present. No pharyngeal swelling, oropharyngeal exudate or uvula swelling.      Tonsils: No tonsillar exudate or tonsillar abscesses.   Cardiovascular:      Rate and Rhythm: Normal rate and regular rhythm.      Heart sounds: Normal heart sounds.   Pulmonary:      Effort: Pulmonary effort is normal. No respiratory distress.      Breath sounds: No stridor. Wheezing present. No rhonchi or rales.      Comments: Mild wheezing. Unable to take a deep breath without coughing.   Chest:      Chest wall: No tenderness.   Skin:     General: Skin is warm and dry.   Neurological:      Mental Status: She is alert.   Psychiatric:         Mood and Affect: Mood normal.         Behavior: Behavior normal.         Thought Content: Thought content normal.         Judgment: Judgment normal.          Office Visit on 12/03/2022   Component Date Value Ref Range Status    SARS Coronavirus 2 Antigen 12/03/2022 Negative  Negative Final     Acceptable 12/03/2022 Yes   Final    Rapid Influenza A Ag 12/03/2022 Negative  Negative Final    Rapid Influenza B Ag 12/03/2022 Negative  Negative Final     Acceptable 12/03/2022 Yes   Final    Rapid Strep A Screen 12/03/2022 Negative  Negative Final     Acceptable 12/03/2022 Yes   Final      Assessment:       1. Influenza-like illness    2. Viral disease exposure    3. Wheeze    4. Cough, unspecified type          Plan:       Influenza-like illness  -     oseltamivir (TAMIFLU) 75 MG capsule; Take 1 capsule (75 mg total) by mouth 2 (two) times daily. for 5 days  Dispense: 10 capsule; Refill: 0  -     promethazine-dextromethorphan (PROMETHAZINE-DM) 6.25-15 mg/5 mL Syrp; Take 5 mLs by mouth every 6 (six) hours as needed (cough).  Dispense: 150 mL; Refill: 0  -     albuterol (PROVENTIL) 2.5 mg /3 mL (0.083 %) nebulizer solution; Take 3 mLs (2.5 mg total) by nebulization every 6 (six) hours as needed for  Wheezing or Shortness of Breath (or cough). Rescue  Dispense: 30 each; Refill: 5    Viral disease exposure  -     SARS Coronavirus 2 Antigen, POCT  -     POCT Influenza A/B  -     POCT rapid strep A    Wheeze  -     dexAMETHasone injection 4 mg  -     dexAMETHasone injection 4 mg    Cough, unspecified type  -     dexAMETHasone injection 4 mg  Nebulizer given for breathing tx. Instructed on how to use  Drink plenty of fluids  OTC meds for fever  Avoid OTC cold meds  Watch BP and F/U with PCP  RTC PRN          Risks, benefits, and side effects were discussed with the patient. All questions were answered to the fullest satisfaction of the patient, and pt verbalized understanding and agreement to treatment plan. Pt was to call with any new or worsening symptoms, or present to the ER.

## 2022-12-03 NOTE — LETTER
December 3, 2022      [facility]  [address] [facility] [place] [address]  Phone: [phone]  Fax: [phone]       Patient: Amanda Mccracken   YOB: 1985  Date of Visit: 12/03/2022    To Whom It May Concern:    ZULAY Mccracken  was at [facility] on 12/03/2022. The patient may return to work/school on 12/12/22 With/no  restrictions. If you have any questions or concerns, or if I can be of further assistance, please do not hesitate to contact me.    Sincerely,    Chas Sanchez MA

## 2023-04-13 ENCOUNTER — OFFICE VISIT (OUTPATIENT)
Dept: OBSTETRICS AND GYNECOLOGY | Facility: CLINIC | Age: 38
End: 2023-04-13
Payer: OTHER GOVERNMENT

## 2023-04-13 VITALS
WEIGHT: 268.19 LBS | DIASTOLIC BLOOD PRESSURE: 94 MMHG | SYSTOLIC BLOOD PRESSURE: 142 MMHG | BODY MASS INDEX: 42.01 KG/M2 | HEART RATE: 81 BPM

## 2023-04-13 DIAGNOSIS — Z30.41 ENCOUNTER FOR SURVEILLANCE OF CONTRACEPTIVE PILLS: ICD-10-CM

## 2023-04-13 DIAGNOSIS — Z11.3 SCREEN FOR STD (SEXUALLY TRANSMITTED DISEASE): ICD-10-CM

## 2023-04-13 DIAGNOSIS — Z72.51 HIGH RISK SEXUAL BEHAVIOR, UNSPECIFIED TYPE: ICD-10-CM

## 2023-04-13 DIAGNOSIS — Z12.4 CERVICAL CANCER SCREENING: Primary | ICD-10-CM

## 2023-04-13 DIAGNOSIS — Z01.419 ROUTINE GYNECOLOGICAL EXAMINATION: ICD-10-CM

## 2023-04-13 DIAGNOSIS — Z11.4 ENCOUNTER FOR SCREENING FOR HIV: ICD-10-CM

## 2023-04-13 LAB
CANDIDA SPECIES: NEGATIVE
GARDNERELLA: NEGATIVE
HAV IGM SER QL: NORMAL
HBV CORE IGM SER QL: NORMAL
HBV SURFACE AG SERPL QL IA: NORMAL
HCV AB SER QL: NORMAL
HIV 1+O+2 AB SERPL QL: NORMAL
SYPHILIS AB INTERPRETATION: NORMAL
TRICHOMONAS: NEGATIVE

## 2023-04-13 PROCEDURE — 87660 BACTERIAL VAGINOSIS: ICD-10-PCS | Mod: ,,, | Performed by: CLINICAL MEDICAL LABORATORY

## 2023-04-13 PROCEDURE — 87480 CANDIDA DNA DIR PROBE: CPT | Mod: ,,, | Performed by: CLINICAL MEDICAL LABORATORY

## 2023-04-13 PROCEDURE — 87624 HPV HI-RISK TYP POOLED RSLT: CPT | Mod: ,,, | Performed by: CLINICAL MEDICAL LABORATORY

## 2023-04-13 PROCEDURE — 87491 CHLMYD TRACH DNA AMP PROBE: CPT | Mod: ,,, | Performed by: CLINICAL MEDICAL LABORATORY

## 2023-04-13 PROCEDURE — 86780 TREPONEMA PALLIDUM (SYPHILIS) ANTIBODY: ICD-10-PCS | Mod: ,,, | Performed by: CLINICAL MEDICAL LABORATORY

## 2023-04-13 PROCEDURE — 86780 TREPONEMA PALLIDUM: CPT | Mod: ,,, | Performed by: CLINICAL MEDICAL LABORATORY

## 2023-04-13 PROCEDURE — 99395 PR PREVENTIVE VISIT,EST,18-39: ICD-10-PCS | Mod: ,,, | Performed by: OBSTETRICS & GYNECOLOGY

## 2023-04-13 PROCEDURE — 87510 BACTERIAL VAGINOSIS: ICD-10-PCS | Mod: ,,, | Performed by: CLINICAL MEDICAL LABORATORY

## 2023-04-13 PROCEDURE — 80074 ACUTE HEPATITIS PANEL: CPT | Mod: ,,, | Performed by: CLINICAL MEDICAL LABORATORY

## 2023-04-13 PROCEDURE — 87591 N.GONORRHOEAE DNA AMP PROB: CPT | Mod: ,,, | Performed by: CLINICAL MEDICAL LABORATORY

## 2023-04-13 PROCEDURE — 87389 HIV-1 AG W/HIV-1&-2 AB AG IA: CPT | Mod: ,,, | Performed by: CLINICAL MEDICAL LABORATORY

## 2023-04-13 PROCEDURE — 87491 CHLAMYDIA/GONORRHOEAE(GC), PCR: ICD-10-PCS | Mod: ,,, | Performed by: CLINICAL MEDICAL LABORATORY

## 2023-04-13 PROCEDURE — 87624 HUMAN PAPILLOMAVIRUS (HPV): ICD-10-PCS | Mod: ,,, | Performed by: CLINICAL MEDICAL LABORATORY

## 2023-04-13 PROCEDURE — 36415 COLL VENOUS BLD VENIPUNCTURE: CPT | Mod: ,,, | Performed by: OBSTETRICS & GYNECOLOGY

## 2023-04-13 PROCEDURE — 88142 CYTOPATH C/V THIN LAYER: CPT | Mod: TC,GCY | Performed by: OBSTETRICS & GYNECOLOGY

## 2023-04-13 PROCEDURE — 87480 BACTERIAL VAGINOSIS: ICD-10-PCS | Mod: ,,, | Performed by: CLINICAL MEDICAL LABORATORY

## 2023-04-13 PROCEDURE — 87660 TRICHOMONAS VAGIN DIR PROBE: CPT | Mod: ,,, | Performed by: CLINICAL MEDICAL LABORATORY

## 2023-04-13 PROCEDURE — 87389 HIV 1 / 2 ANTIBODY: ICD-10-PCS | Mod: ,,, | Performed by: CLINICAL MEDICAL LABORATORY

## 2023-04-13 PROCEDURE — 36415 PR COLLECTION VENOUS BLOOD,VENIPUNCTURE: ICD-10-PCS | Mod: ,,, | Performed by: OBSTETRICS & GYNECOLOGY

## 2023-04-13 PROCEDURE — 87591 CHLAMYDIA/GONORRHOEAE(GC), PCR: ICD-10-PCS | Mod: ,,, | Performed by: CLINICAL MEDICAL LABORATORY

## 2023-04-13 PROCEDURE — 80074 HEPATITIS PANEL, ACUTE: ICD-10-PCS | Mod: ,,, | Performed by: CLINICAL MEDICAL LABORATORY

## 2023-04-13 PROCEDURE — 87510 GARDNER VAG DNA DIR PROBE: CPT | Mod: ,,, | Performed by: CLINICAL MEDICAL LABORATORY

## 2023-04-13 PROCEDURE — 99395 PREV VISIT EST AGE 18-39: CPT | Mod: ,,, | Performed by: OBSTETRICS & GYNECOLOGY

## 2023-04-13 RX ORDER — LEVONORGESTREL AND ETHINYL ESTRADIOL 0.1-0.02MG
1 KIT ORAL DAILY
Qty: 28 TABLET | Refills: 11 | Status: SHIPPED | OUTPATIENT
Start: 2023-04-13

## 2023-04-13 RX ORDER — VALACYCLOVIR HYDROCHLORIDE 1 G/1
1000 TABLET, FILM COATED ORAL DAILY
Qty: 30 TABLET | Refills: 11 | Status: SHIPPED | OUTPATIENT
Start: 2023-04-13 | End: 2024-04-12

## 2023-04-13 RX ORDER — FAMOTIDINE 20 MG/1
20 TABLET, FILM COATED ORAL
COMMUNITY
Start: 2023-01-12

## 2023-04-13 NOTE — PROGRESS NOTES
CC: Well woman exam    Amanda Mccracken is a 37 y.o. female  presents for well woman exam.    LMP: Patient's last menstrual period was 2023..    Last mammogram: n/a  Last Colonoscopy: n/a    Denies any issues, problems, or complaints.     Past Medical History:   Diagnosis Date    Acute superficial gastritis without hemorrhage 2021    Chlamydia     Diverticula, colon 2021    Esophageal dysphagia 2021    Gonorrhea     HSV (herpes simplex virus) infection     Lower GI bleeding 2021    PTSD (post-traumatic stress disorder)      Past Surgical History:   Procedure Laterality Date    CHOLECYSTECTOMY      DIAGNOSTIC LAPAROSCOPY      DILATION AND CURETTAGE OF UTERUS      LAPAROSCOPIC MYOMECTOMY  2013    TOTAL REDUCTION MAMMOPLASTY       Social History     Socioeconomic History    Marital status: Single   Tobacco Use    Smoking status: Some Days     Passive exposure: Current    Smokeless tobacco: Never    Tobacco comments:     Pt states she smokes marijuana   Substance and Sexual Activity    Alcohol use: Yes     Comment: socially    Drug use: Not Currently     Comment: CBD use    Sexual activity: Yes     Partners: Male     Birth control/protection: OCP, None     Family History   Problem Relation Age of Onset    Hypertension Father     Hypertension Mother     No Known Problems Brother     No Known Problems Sister     No Known Problems Brother     No Known Problems Brother     No Known Problems Sister     No Known Problems Sister     No Known Problems Son      OB History          1    Para   1    Term   1            AB        Living   1         SAB        IAB        Ectopic        Multiple        Live Births   1                 BP (!) 142/94   Pulse 81   Wt 121.7 kg (268 lb 3.2 oz)   LMP 2023   BMI 42.01 kg/m²       ROS:  GENERAL: Denies weight gain or weight loss. Feeling well overall.   SKIN: Denies rash or lesions.   HEAD: Denies head injury or headache.   NODES:  Denies enlarged lymph nodes.   CHEST: Denies chest pain or shortness of breath.   CARDIOVASCULAR: Denies palpitations or left sided chest pain.   ABDOMEN: No abdominal pain, constipation, diarrhea, nausea, vomiting or rectal bleeding.   URINARY: No frequency, dysuria, hematuria, or burning on urination.  REPRODUCTIVE: See HPI.   BREASTS: The patient performs breast self-examination and denies pain, lumps, or nipple discharge.   HEMATOLOGIC: No easy bruisability or excessive bleeding.   MUSCULOSKELETAL: Denies joint pain or swelling.   NEUROLOGIC: Denies syncope or weakness.   PSYCHIATRIC: Denies depression, anxiety or mood swings.    PHYSICAL EXAM:  APPEARANCE: Well nourished, well developed, in no acute distress.  AFFECT: WNL, alert and oriented x 3  SKIN: No acne or hirsutism  NECK: Neck symmetric without masses or thyromegaly  NODES: No inguinal, cervical, axillary, or femoral lymph node enlargement  CHEST: Good respiratory effect  ABDOMEN: Soft.  No tenderness or masses.  No hepatosplenomegaly.  No hernias.  BREASTS: Symmetrical, no skin changes or visible lesions.  No palpable masses, nipple discharge bilaterally.  PELVIC: Normal external genitalia without lesions.  Normal hair distribution.  Adequate perineal body, normal urethral meatus.  Vagina moist and well rugated without lesions or discharge.  Cervix pink, without lesions, discharge or tenderness.  No significant cystocele or rectocele.  Bimanual exam shows uterus to be normal size, regular, mobile and nontender.  Adnexa without masses or tenderness.    EXTREMITIES: No edema.    Cervical cancer screening  -     ThinPrep Pap Test; Future; Expected date: 04/13/2023    Routine gynecological examination  -     ThinPrep Pap Test; Future; Expected date: 04/13/2023    Screen for STD (sexually transmitted disease)  -     Bacterial Vaginosis; Future; Expected date: 04/13/2023  -     Chlamydia/GC, PCR; Future; Expected date: 04/13/2023  -     Treponema Pallidum  (Syphillis) Antibody; Future; Expected date: 04/13/2023  -     HIV 1/2 Ag/Ab (4th Gen); Future; Expected date: 04/13/2023  -     Hepatitis Panel, Acute; Future; Expected date: 04/13/2023    High risk sexual behavior, unspecified type  -     Bacterial Vaginosis; Future; Expected date: 04/13/2023  -     Chlamydia/GC, PCR; Future; Expected date: 04/13/2023  -     Treponema Pallidum (Syphillis) Antibody; Future; Expected date: 04/13/2023  -     HIV 1/2 Ag/Ab (4th Gen); Future; Expected date: 04/13/2023  -     Hepatitis Panel, Acute; Future; Expected date: 04/13/2023    Encounter for screening for HIV  -     HIV 1/2 Ag/Ab (4th Gen); Future; Expected date: 04/13/2023    Encounter for surveillance of contraceptive pills  -     levonorgestrel-ethinyl estradiol (AVIANE,ALESSE,LESSINA) 0.1-20 mg-mcg per tablet; Take 1 tablet by mouth once daily.  Dispense: 28 tablet; Refill: 11    Other orders  -     valACYclovir (VALTREX) 1000 MG tablet; Take 1 tablet (1,000 mg total) by mouth once daily.  Dispense: 30 tablet; Refill: 11            Patient was counseled today on A.C.S. Pap guidelines and recommendations for yearly pelvic exams, mammograms and monthly self breast exams; to see her PCP for other health maintenance.   Exercise regimen encouraged  Healthy food choices encouraged  Questions answered to desired level of satisfaction  Verbalized understanding to all information and instructions    Follow up in about 1 year (around 4/13/2024) for annual.      Giancarlo Bennett M.D., FCOG    OB/GYN

## 2023-04-14 LAB
CHLAMYDIA BY PCR: NEGATIVE
N. GONORRHOEAE (GC) BY PCR: NEGATIVE

## 2023-04-17 LAB
GH SERPL-MCNC: NORMAL NG/ML
INSULIN SERPL-ACNC: NORMAL U[IU]/ML
LAB AP CLINICAL INFORMATION: NORMAL
LAB AP GYN INTERPRETATION: NEGATIVE
LAB AP PAP DISCLAIMER COMMENTS: NORMAL
RENIN PLAS-CCNC: NORMAL NG/ML/H

## 2023-04-19 LAB
HPV 16: NEGATIVE
HPV 18: NEGATIVE
HPV OTHER: NEGATIVE

## 2023-11-24 ENCOUNTER — HOSPITAL ENCOUNTER (EMERGENCY)
Facility: HOSPITAL | Age: 38
Discharge: HOME | End: 2023-11-24
Payer: OTHER GOVERNMENT

## 2023-11-24 VITALS
HEART RATE: 105 BPM | SYSTOLIC BLOOD PRESSURE: 150 MMHG | WEIGHT: 261 LBS | HEIGHT: 67 IN | TEMPERATURE: 99 F | RESPIRATION RATE: 20 BRPM | BODY MASS INDEX: 40.97 KG/M2 | OXYGEN SATURATION: 98 % | DIASTOLIC BLOOD PRESSURE: 100 MMHG

## 2023-11-24 DIAGNOSIS — J06.9 UPPER RESPIRATORY TRACT INFECTION, UNSPECIFIED TYPE: ICD-10-CM

## 2023-11-24 DIAGNOSIS — J11.1 INFLUENZA: Primary | ICD-10-CM

## 2023-11-24 PROCEDURE — 99284 PR EMERGENCY DEPT VISIT,LEVEL IV: ICD-10-PCS | Mod: ,,, | Performed by: NURSE PRACTITIONER

## 2023-11-24 PROCEDURE — 63600175 PHARM REV CODE 636 W HCPCS: Performed by: NURSE PRACTITIONER

## 2023-11-24 PROCEDURE — 96372 THER/PROPH/DIAG INJ SC/IM: CPT | Performed by: NURSE PRACTITIONER

## 2023-11-24 PROCEDURE — 99284 EMERGENCY DEPT VISIT MOD MDM: CPT | Mod: ,,, | Performed by: NURSE PRACTITIONER

## 2023-11-24 PROCEDURE — 99284 EMERGENCY DEPT VISIT MOD MDM: CPT

## 2023-11-24 RX ORDER — CEFTRIAXONE 1 G/1
1 INJECTION, POWDER, FOR SOLUTION INTRAMUSCULAR; INTRAVENOUS
Status: COMPLETED | OUTPATIENT
Start: 2023-11-24 | End: 2023-11-24

## 2023-11-24 RX ORDER — AZITHROMYCIN 500 MG/1
500 TABLET, FILM COATED ORAL DAILY
Qty: 5 TABLET | Refills: 0 | Status: SHIPPED | OUTPATIENT
Start: 2023-11-24 | End: 2023-11-29

## 2023-11-24 RX ADMIN — CEFTRIAXONE 1 G: 1 INJECTION, POWDER, FOR SOLUTION INTRAMUSCULAR; INTRAVENOUS at 12:11

## 2023-11-24 NOTE — PROVIDER PROGRESS NOTES - EMERGENCY DEPT.
Encounter Date: 11/24/2023    ED Physician Progress Notes        MDM    Patient presents for emergent evaluation of acute URI that poses a threat to life and/or bodily function following a positive flu B swab  In the ED patient found to have acute URI.      Discharge MDM  Patient was managed in the ED with Im rocephin .    The response to treatment was good.    Patient was discharged in stable condition.  Detailed return precautions discussed.

## 2023-11-24 NOTE — ED PROVIDER NOTES
Encounter Date: 11/24/2023       History     Chief Complaint   Patient presents with    Cough    Influenza     Ms Patel presents to the ED c complaints of body aches, cough and chest congestion since last week. She tested positive for the flu Monday and is currently taking tamilfu and cough syrup. She states she is not getting better at all.       Review of patient's allergies indicates:  No Known Allergies  Past Medical History:   Diagnosis Date    Acute superficial gastritis without hemorrhage 5/27/2021    Chlamydia     Diverticula, colon 6/9/2021    Esophageal dysphagia 5/27/2021    Gonorrhea     HSV (herpes simplex virus) infection     Lower GI bleeding 6/9/2021    PTSD (post-traumatic stress disorder)      Past Surgical History:   Procedure Laterality Date    CHOLECYSTECTOMY      DIAGNOSTIC LAPAROSCOPY      DILATION AND CURETTAGE OF UTERUS      LAPAROSCOPIC MYOMECTOMY  02/05/2013    TOTAL REDUCTION MAMMOPLASTY       Family History   Problem Relation Age of Onset    Hypertension Father     Hypertension Mother     No Known Problems Brother     No Known Problems Sister     No Known Problems Brother     No Known Problems Brother     No Known Problems Sister     No Known Problems Sister     No Known Problems Son      Social History     Tobacco Use    Smoking status: Some Days     Passive exposure: Current    Smokeless tobacco: Never    Tobacco comments:     Pt states she smokes marijuana   Substance Use Topics    Alcohol use: Yes     Comment: socially    Drug use: Not Currently     Comment: CBD use     Review of Systems   Constitutional:  Positive for chills and fever.   HENT:  Positive for congestion.    Respiratory:  Positive for cough.        Physical Exam     Initial Vitals [11/24/23 1136]   BP Pulse Resp Temp SpO2   (!) 159/110 105 20 98.6 °F (37 °C) 98 %      MAP       --         Physical Exam    Vitals reviewed.  Constitutional: She appears well-developed.   HENT:   Head: Normocephalic.   Nose: Rhinorrhea  present.   Eyes: Pupils are equal, round, and reactive to light.   Neck:   Normal range of motion.  Cardiovascular:  Normal rate.           Abdominal: Abdomen is soft.   Musculoskeletal:      Cervical back: Normal range of motion.     Skin: Skin is warm. Capillary refill takes less than 2 seconds.   Psychiatric: She has a normal mood and affect. Her behavior is normal. Judgment and thought content normal.         Medical Screening Exam   See Full Note    ED Course   Procedures  Labs Reviewed - No data to display       Imaging Results    None          Medications   cefTRIAXone injection 1 g (has no administration in time range)     Medical Decision Making                                 Clinical Impression:   Final diagnoses:  [J11.1] Influenza (Primary)  [J06.9] Upper respiratory tract infection, unspecified type               Ikes, ZIA Summers  11/24/23 1211

## 2023-11-24 NOTE — Clinical Note
"Amanda BLOCK" Kaykay was seen and treated in our emergency department on 11/24/2023.  She may return to school on 11/27/2023.      If you have any questions or concerns, please don't hesitate to call.      Nikki Domingo, ZIA"